# Patient Record
Sex: MALE | Race: WHITE | NOT HISPANIC OR LATINO | URBAN - METROPOLITAN AREA
[De-identification: names, ages, dates, MRNs, and addresses within clinical notes are randomized per-mention and may not be internally consistent; named-entity substitution may affect disease eponyms.]

---

## 2021-07-18 ENCOUNTER — EMERGENCY (EMERGENCY)
Facility: HOSPITAL | Age: 36
LOS: 1 days | Discharge: HOME | End: 2021-07-18
Attending: EMERGENCY MEDICINE | Admitting: EMERGENCY MEDICINE
Payer: OTHER MISCELLANEOUS

## 2021-07-18 VITALS
SYSTOLIC BLOOD PRESSURE: 117 MMHG | WEIGHT: 169.98 LBS | TEMPERATURE: 98 F | RESPIRATION RATE: 16 BRPM | HEART RATE: 76 BPM | HEIGHT: 72 IN | OXYGEN SATURATION: 98 % | DIASTOLIC BLOOD PRESSURE: 74 MMHG
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
## 2021-07-18 DIAGNOSIS — S60.211A CONTUSION OF RIGHT WRIST, INITIAL ENCOUNTER: ICD-10-CM

## 2021-07-18 DIAGNOSIS — M79.641 PAIN IN RIGHT HAND: ICD-10-CM

## 2021-07-18 PROCEDURE — 73100 X-RAY EXAM OF WRIST: CPT | Mod: 26,RT

## 2021-07-18 PROCEDURE — 99283 EMERGENCY DEPT VISIT LOW MDM: CPT | Mod: 25

## 2021-07-18 PROCEDURE — 73110 X-RAY EXAM OF WRIST: CPT | Mod: 26,RT

## 2021-07-18 PROCEDURE — 73130 X-RAY EXAM OF HAND: CPT | Mod: 26,RT

## 2021-07-18 NOTE — ED PROVIDER NOTE - NSFOLLOWUPINSTRUCTIONS_ED_ALL_ED_FT
Call Dr Kyle the hand surgeon tomorrow morning and see him in the office on Monday or Tuesday.  Ice the area 10 minutes on ice and 10 minutes off ice as tolerated today and tomorrow.  Ibuprofen or Naproxen as needed for pain.  Use the splint as tolerated.

## 2021-07-18 NOTE — ED PROVIDER NOTE - OBJECTIVE STATEMENT
27 y/o M with no PMHx presents to the ED for R hand pain. 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 He was on the job earlier today when his R hand was caught by the door of the ambulance. He currently endorses 3/10 pain and weakness to the R hand. Pt states he was initially nauseous from the pain but it has since resolved. Pt denies any other injuries.

## 2021-07-18 NOTE — ED PROVIDER NOTE - CARE PROVIDER_API CALL
Danielito Kyle)  Plastic Surgery; Surgery of the Hand  [address] [place] [address], [address], [place] [address]  Phone: [phone]  Fax: [phone]  Follow Up Time:

## 2021-07-18 NOTE — ED PROVIDER NOTE - CLINICAL SUMMARY MEDICAL DECISION MAKING FREE TEXT BOX
27 y/o M presenting with R hand/wrist pain. Exam is remarkable for tenderness to the proximal, lateral, and medial aspects of the R wrist. Will order XR imaging to r/o Fx. Will reassess clinically after results have been obtained.

## 2022-01-07 NOTE — ED ADULT NURSE NOTE - TEMPLATE
Called pt and informed that Dr Quiroz signed [facility] papers on 1/5/2022 and paperwork faxed.  Pt verbalized understanding  
Called pt to notify that [facility] paperwork is complete but without signature.  Left message on pt voicemail to call our office.  
General
